# Patient Record
Sex: FEMALE | Race: WHITE | NOT HISPANIC OR LATINO | Employment: FULL TIME | URBAN - METROPOLITAN AREA
[De-identification: names, ages, dates, MRNs, and addresses within clinical notes are randomized per-mention and may not be internally consistent; named-entity substitution may affect disease eponyms.]

---

## 2018-12-26 ENCOUNTER — HOSPITAL ENCOUNTER (EMERGENCY)
Facility: MEDICAL CENTER | Age: 28
End: 2018-12-26
Attending: EMERGENCY MEDICINE
Payer: COMMERCIAL

## 2018-12-26 VITALS
RESPIRATION RATE: 18 BRPM | SYSTOLIC BLOOD PRESSURE: 135 MMHG | TEMPERATURE: 97.2 F | DIASTOLIC BLOOD PRESSURE: 89 MMHG | HEART RATE: 80 BPM | WEIGHT: 203.93 LBS | OXYGEN SATURATION: 96 % | BODY MASS INDEX: 36.13 KG/M2 | HEIGHT: 63 IN

## 2018-12-26 DIAGNOSIS — G44.209 TENSION HEADACHE: ICD-10-CM

## 2018-12-26 DIAGNOSIS — M79.18 MUSCULOSKELETAL PAIN: ICD-10-CM

## 2018-12-26 LAB — HCG SERPL QL: NEGATIVE

## 2018-12-26 PROCEDURE — 700102 HCHG RX REV CODE 250 W/ 637 OVERRIDE(OP): Performed by: EMERGENCY MEDICINE

## 2018-12-26 PROCEDURE — 96374 THER/PROPH/DIAG INJ IV PUSH: CPT

## 2018-12-26 PROCEDURE — 99284 EMERGENCY DEPT VISIT MOD MDM: CPT

## 2018-12-26 PROCEDURE — A9270 NON-COVERED ITEM OR SERVICE: HCPCS | Performed by: EMERGENCY MEDICINE

## 2018-12-26 PROCEDURE — 84703 CHORIONIC GONADOTROPIN ASSAY: CPT

## 2018-12-26 PROCEDURE — 700111 HCHG RX REV CODE 636 W/ 250 OVERRIDE (IP): Performed by: EMERGENCY MEDICINE

## 2018-12-26 PROCEDURE — 96375 TX/PRO/DX INJ NEW DRUG ADDON: CPT

## 2018-12-26 RX ORDER — KETOROLAC TROMETHAMINE 30 MG/ML
30 INJECTION, SOLUTION INTRAMUSCULAR; INTRAVENOUS ONCE
Status: COMPLETED | OUTPATIENT
Start: 2018-12-26 | End: 2018-12-26

## 2018-12-26 RX ORDER — DIAZEPAM 5 MG/1
5 TABLET ORAL NIGHTLY PRN
Qty: 2 TAB | Refills: 0 | Status: SHIPPED | OUTPATIENT
Start: 2018-12-26 | End: 2018-12-28

## 2018-12-26 RX ORDER — DIAZEPAM 5 MG/1
5 TABLET ORAL ONCE
Status: COMPLETED | OUTPATIENT
Start: 2018-12-26 | End: 2018-12-26

## 2018-12-26 RX ORDER — IBUPROFEN 600 MG/1
600 TABLET ORAL EVERY 8 HOURS PRN
Qty: 15 TAB | Refills: 0 | Status: SHIPPED | OUTPATIENT
Start: 2018-12-26 | End: 2018-12-27

## 2018-12-26 RX ORDER — DIPHENHYDRAMINE HYDROCHLORIDE 50 MG/ML
25 INJECTION INTRAMUSCULAR; INTRAVENOUS ONCE
Status: COMPLETED | OUTPATIENT
Start: 2018-12-26 | End: 2018-12-26

## 2018-12-26 RX ORDER — METOCLOPRAMIDE HYDROCHLORIDE 5 MG/ML
10 INJECTION INTRAMUSCULAR; INTRAVENOUS ONCE
Status: COMPLETED | OUTPATIENT
Start: 2018-12-26 | End: 2018-12-26

## 2018-12-26 RX ADMIN — DIPHENHYDRAMINE HYDROCHLORIDE 25 MG: 50 INJECTION, SOLUTION INTRAMUSCULAR; INTRAVENOUS at 10:33

## 2018-12-26 RX ADMIN — DIAZEPAM 5 MG: 5 TABLET ORAL at 10:33

## 2018-12-26 RX ADMIN — METOCLOPRAMIDE 10 MG: 5 INJECTION, SOLUTION INTRAMUSCULAR; INTRAVENOUS at 10:33

## 2018-12-26 RX ADMIN — KETOROLAC TROMETHAMINE 30 MG: 30 INJECTION, SOLUTION INTRAMUSCULAR at 10:33

## 2018-12-26 NOTE — ED PROVIDER NOTES
"ED Provider Note  CHIEF COMPLAINT  Headache     HPI  Charo Leonardo is a 28 y.o. female who presents to the emergency department with a headache. The patient states that she slipped in the shower about 4 weeks ago and hit the back of her head. She did not pass out. She was able to get up and walk around afterward. She denies any associated numbness, weakness, or tingling. She does admit to intermittent blurriness but denies any loss of vision. She has continued to have pain since that time. She states that the pain has gotten much worse over the past few. The pain is located on the back of her head and the op of her neck. Rubbing and cold packs over the area improves the pain. Bright lights make the pain worse which usually happens when she has a headache. She has tried tylenol and aleve. Her last dose of tylenol was at 3 AM. She has a history of migraines since she was 14 years old. She admits to nausea but has not vomited. She denies any seizures, syncope, chest pain, shortness of breath, abdominal pain, urinary symptoms, or diarrhea.     REVIEW OF SYSTEMS  See HPI for further details. All other systems are negative.     PAST MEDICAL HISTORY   has a past medical history of Migraines.    SOCIAL HISTORY  Social History     Social History Main Topics   • Smoking status: Never Smoker   • Smokeless tobacco: Not on file   • Alcohol use No   • Drug use: Yes      Comment: marijuana   • Sexual activity: Not on file       SURGICAL HISTORY  patient denies any surgical history    CURRENT MEDICATIONS  Home Medications    **Home medications have not yet been reviewed for this encounter**         ALLERGIES  Allergies not on file    PHYSICAL EXAM  VITAL SIGNS: /89   Pulse 84   Temp 36.4 °C (97.5 °F) (Temporal)   Resp 18   Ht 1.6 m (5' 3\")   Wt 92.5 kg (203 lb 14.8 oz)   LMP 12/12/2018 (Approximate)   SpO2 96%   Breastfeeding? No   BMI 36.12 kg/m²    Constitutional: Alert and in no apparent distress.  HENT: " Normocephalic atraumatic. Bilateral external ears normal. Nose normal. Mucous membranes are moist.  Eyes: Pupils are equal and reactive. Conjunctiva normal. Non-icteric sclera.   Neck: Normal range of motion without difficulty.  There is no midline cervical spine tenderness.  There is tenderness to palpation over the left paraspinal muscles of the C-spine at the base of the skull.  There is hypertonicity at this location as well.  Cardiovascular: Regular rate and rhythm. No murmurs, gallops or rubs.  Thorax & Lungs: Breath sounds are clear to auscultation bilaterally. No wheezing, rhonchi or rales.  Abdomen: Soft, nontender and nondistended. No peritoneal signs noted.  Skin: Warm and dry. No rashes are noted.  Back: No bony tenderness, No CVA tenderness.   Extremities: 2+ peripheral pulses. Cap refill is less than 2 seconds. No edema, cyanosis, or clubbing.  Musculoskeletal: Good range of motion in all major joints. No tenderness to palpation or major deformities noted.   Neurologic: Alert and oriented ×3.  Cranial nerves II through XII are grossly intact.  5 out of 5 muscle strength bilateral upper and lower extremities.  Sensation is grossly intact.  No pronator drift.  Normal finger to nose.  Normal gait.  Psychiatric: Affect is normal. Judgment appears to be intact.    COURSE & MEDICAL DECISION MAKING  Pertinent Labs & Imaging studies reviewed. (See chart for details)    This is a 28-year-old female presenting to the ED for the evaluation of a headache. On initial evaluation, the patient appeared well and in no acute distress. Her vital signs were stable.  She is grossly neurologically intact and her exam was reassuring.  She did have some tenderness to palpation in the paraspinal muscles of the upper left cervical spine.  She had hypertonicity in this area as well.  Given that her injury was 4 weeks out, that she has no evidence of traumatic injury on exam, and that she is neurologically intact, I do not think  that she needs a CAT scan of her head.  Additionally, she had no midline cervical spine tenderness and had obvious focal tenderness in the paraspinal muscles of the left upper cervical spine.  I have low clinical suspicion for serious cervical spine injury at this time.  The patient was treated here in the emergency department with a migraine cocktail as well as Valium for muscle spasm.  Her pain completely resolved and she tolerated an oral challenge without difficulty.  I do believe she stable for discharge at this time.  She will follow-up with her primary care physician return to the ED with any worsening signs or symptoms.    The patient presents with headache without signs of CNS bleed, stroke, infection, or other serious etiology. The patient is neurologically intact. Given the extremely low risk of these diagnoses further testing and evaluation for these possibilities does not appear to be indicated at this time. The patient has been instructed to return if the symptoms worsen or change in any way.    FINAL IMPRESSION  1. Tension headache    2. Musculoskeletal pain        PRESCRIPTIONS  New Prescriptions    DIAZEPAM (VALIUM) 5 MG TAB    Take 1 Tab by mouth at bedtime as needed (pain) for up to 2 days.    IBUPROFEN (MOTRIN) 600 MG TAB    Take 1 Tab by mouth every 8 hours as needed for Moderate Pain for up to 5 days.       FOLLOW UP  [address] [address]  [phone]  Call in 1 day  To schedule follow-up appointment    [facility], Emergency Dept  [address]  [facility] [address]  [phone]  Go to   As needed, If symptoms worsen        -DISCHARGE-           Electronically signed by: Sera Juan, 12/26/2018 9:52 AM

## 2018-12-26 NOTE — ED NOTES
Pt given written and oral discharge instructions. Pt verbalized understanding of all instructions given. All questions answered. VSS. IV removed. Pt given paper prescriptions as ordered and educated on use and side effects. Pt signed controlled substance informed consent form. Pt reminded not to drive as she has received valium here in the ER today. Given f/u instructions and educated on s/s of when to return to the ER. Pt ambulating independently upon time of discharge in stable condition.

## 2018-12-27 ENCOUNTER — HOSPITAL ENCOUNTER (EMERGENCY)
Facility: MEDICAL CENTER | Age: 28
End: 2018-12-28
Attending: EMERGENCY MEDICINE
Payer: COMMERCIAL

## 2018-12-27 ENCOUNTER — APPOINTMENT (OUTPATIENT)
Dept: RADIOLOGY | Facility: MEDICAL CENTER | Age: 28
End: 2018-12-27
Attending: EMERGENCY MEDICINE
Payer: COMMERCIAL

## 2018-12-27 DIAGNOSIS — G43.809 OTHER MIGRAINE WITHOUT STATUS MIGRAINOSUS, NOT INTRACTABLE: ICD-10-CM

## 2018-12-27 PROCEDURE — 700111 HCHG RX REV CODE 636 W/ 250 OVERRIDE (IP): Performed by: EMERGENCY MEDICINE

## 2018-12-27 PROCEDURE — 96374 THER/PROPH/DIAG INJ IV PUSH: CPT

## 2018-12-27 PROCEDURE — 70450 CT HEAD/BRAIN W/O DYE: CPT

## 2018-12-27 PROCEDURE — 700101 HCHG RX REV CODE 250

## 2018-12-27 PROCEDURE — 20552 NJX 1/MLT TRIGGER POINT 1/2: CPT

## 2018-12-27 PROCEDURE — 96375 TX/PRO/DX INJ NEW DRUG ADDON: CPT

## 2018-12-27 PROCEDURE — 99284 EMERGENCY DEPT VISIT MOD MDM: CPT

## 2018-12-27 PROCEDURE — 72125 CT NECK SPINE W/O DYE: CPT

## 2018-12-27 PROCEDURE — 36415 COLL VENOUS BLD VENIPUNCTURE: CPT

## 2018-12-27 RX ORDER — KETOROLAC TROMETHAMINE 30 MG/ML
15 INJECTION, SOLUTION INTRAMUSCULAR; INTRAVENOUS ONCE
Status: COMPLETED | OUTPATIENT
Start: 2018-12-27 | End: 2018-12-27

## 2018-12-27 RX ORDER — DIPHENHYDRAMINE HYDROCHLORIDE 50 MG/ML
25 INJECTION INTRAMUSCULAR; INTRAVENOUS ONCE
Status: COMPLETED | OUTPATIENT
Start: 2018-12-27 | End: 2018-12-27

## 2018-12-27 RX ORDER — METOCLOPRAMIDE HYDROCHLORIDE 5 MG/ML
10 INJECTION INTRAMUSCULAR; INTRAVENOUS ONCE
Status: COMPLETED | OUTPATIENT
Start: 2018-12-27 | End: 2018-12-27

## 2018-12-27 RX ORDER — DIPHENHYDRAMINE HYDROCHLORIDE 50 MG/ML
INJECTION INTRAMUSCULAR; INTRAVENOUS
Status: COMPLETED
Start: 2018-12-27 | End: 2018-12-27

## 2018-12-27 RX ORDER — KETOROLAC TROMETHAMINE 30 MG/ML
INJECTION, SOLUTION INTRAMUSCULAR; INTRAVENOUS
Status: COMPLETED
Start: 2018-12-27 | End: 2018-12-27

## 2018-12-27 RX ORDER — BUPIVACAINE HYDROCHLORIDE 5 MG/ML
INJECTION, SOLUTION EPIDURAL; INTRACAUDAL
Status: COMPLETED
Start: 2018-12-27 | End: 2018-12-27

## 2018-12-27 RX ADMIN — KETOROLAC TROMETHAMINE 15 MG: 30 INJECTION, SOLUTION INTRAMUSCULAR at 23:00

## 2018-12-27 RX ADMIN — BUPIVACAINE HYDROCHLORIDE: 5 INJECTION, SOLUTION EPIDURAL; INTRACAUDAL; PERINEURAL at 22:30

## 2018-12-27 RX ADMIN — METOCLOPRAMIDE 10 MG: 5 INJECTION, SOLUTION INTRAMUSCULAR; INTRAVENOUS at 23:00

## 2018-12-27 RX ADMIN — DIPHENHYDRAMINE HYDROCHLORIDE 25 MG: 50 INJECTION, SOLUTION INTRAMUSCULAR; INTRAVENOUS at 23:00

## 2018-12-27 ASSESSMENT — ENCOUNTER SYMPTOMS
ABDOMINAL PAIN: 0
NAUSEA: 1
NECK PAIN: 1
DIARRHEA: 0
FEVER: 0
VOMITING: 1
TREMORS: 0
CONSTIPATION: 0
HEADACHES: 1
CHILLS: 0

## 2018-12-27 ASSESSMENT — PAIN SCALES - GENERAL
PAINLEVEL_OUTOF10: 3
PAINLEVEL_OUTOF10: 10
PAINLEVEL_OUTOF10: 10

## 2018-12-28 VITALS
DIASTOLIC BLOOD PRESSURE: 76 MMHG | HEIGHT: 63 IN | OXYGEN SATURATION: 99 % | RESPIRATION RATE: 20 BRPM | TEMPERATURE: 98.4 F | BODY MASS INDEX: 36.95 KG/M2 | SYSTOLIC BLOOD PRESSURE: 127 MMHG | WEIGHT: 208.56 LBS | HEART RATE: 86 BPM

## 2018-12-28 ASSESSMENT — PAIN SCALES - GENERAL: PAINLEVEL_OUTOF10: 0

## 2018-12-28 NOTE — ED NOTES
"D/c inst reviewed w/ the pt. The pt verb understanding and denies questions.  The pt s.o. Picked the pt up. The pt stated that her ha is \"gone\".   "

## 2018-12-28 NOTE — ED TRIAGE NOTES
Chief Complaint   Patient presents with   • Neck Pain     Pt states she was here yesterday am and was evaluated for same complaint of neck pain after slipping in the shower 4 weeks ago     Pt also reports being dizzy and that pain has worsened since yesterday.

## 2018-12-28 NOTE — ED PROVIDER NOTES
ED Provider Note    CHIEF COMPLAINT  Chief Complaint   Patient presents with   • Neck Pain     Pt states she was here yesterday am and was evaluated for same complaint of neck pain after slipping in the shower 4 weeks ago       HPI  Charo Leonardo is a 28 y.o. female who presents to the emergency department with ongoing neck pain and headache.  Patient with a long-standing history of migraines.  She reports that she has had migraines since she was around 14 years old.  Patient reports that she fell around 4 weeks ago and struck the back of her neck and head she reports ongoing pain since that time.  She was seen yesterday here for identical complaint and sent home with a muscle relaxer and NSAIDs.  She reports her headaches have continued.  She is requesting CT scans for further evaluation into her pain.  She reports her headaches do have some associated nausea and vomiting.  Emesis is nonbloody nonbilious.  She reports her headaches have been present for the last 4 weeks.  She denies any associated weakness or numbness.  She denies any bowel or bladder incontinence.  She denies any fevers or constitutional symptoms.  Patient denies any associated chest pain.  Pain in her neck is left superior lateral neck.    REVIEW OF SYSTEMS  Review of Systems   Constitutional: Negative for chills, fever and malaise/fatigue.   Cardiovascular: Negative for chest pain.   Gastrointestinal: Positive for nausea and vomiting. Negative for abdominal pain, constipation and diarrhea.   Genitourinary: Negative for dysuria, frequency and urgency.   Musculoskeletal: Positive for neck pain.   Neurological: Positive for headaches. Negative for tremors.     See HPI for further details. All other systems are negative.     PAST MEDICAL HISTORY   has a past medical history of Migraines.    SOCIAL HISTORY  Social History     Social History Main Topics   • Smoking status: Never Smoker   • Smokeless tobacco: Former User   • Alcohol use No   •  Drug use: Yes      Comment: marijuana   • Sexual activity: Not on file       SURGICAL HISTORY  patient denies any surgical history    CURRENT MEDICATIONS  Home Medications    **Home medications have not yet been reviewed for this encounter**         ALLERGIES  No Known Allergies    PHYSICAL EXAM  Physical Exam   Constitutional: She is oriented to person, place, and time. She appears well-developed and well-nourished.   HENT:   Head: Normocephalic and atraumatic.   Eyes: Conjunctivae are normal.   Neck: Normal range of motion. Neck supple.   Cardiovascular: Normal rate and regular rhythm.    Pulmonary/Chest: Effort normal and breath sounds normal.   Abdominal: Soft. Bowel sounds are normal. She exhibits no distension. There is no tenderness. There is no rebound.   Musculoskeletal:   Mild pain to palpation around C2 through C4 paraspinal musculature.  There is no midline tenderness.   Neurological: She is alert and oriented to person, place, and time.   Distal and proximal strength 5/5 in upper and lower extremities. Normal gait. No dysmetria. No sensation deficits. No visual field deficits. Cranial nerves intact.      Skin: Skin is warm and dry. No rash noted.   Psychiatric: She has a normal mood and affect. Her behavior is normal.     Procedure Note  Patient verbally consented for trigger point injection; area was cleaned with chlorhexidine prior to injection.  Sterile technique was employed.  Paraspinal injections of 1 cc half bupivacaine, half lidocaine with epinephrine were injected at point of maximal tenderness.  I stayed clear of midline.  No injections were made at midline or directed towards midline.  3 injections were made.  Patient tolerated very well.      COURSE & MEDICAL DECISION MAKING  Pertinent Labs & Imaging studies reviewed. (See chart for details)  Very well-appearing patient here with migraines likely exacerbated by cervical spasm versus slipped disc.  Patient without any associated neurologic  sequelae to suggest dissection, the pain is not located over the carotid artery.  Patient without any associated dizziness or bulbar symptoms to suggest vertebral artery dissection.  There is no associated constitutional symptoms or fever to suggest infection.  Patient is very concerned that she has never had any imaging of her neck or head and is requesting this.  We have discussed the risks and benefits of CT, despite the risk of radiation patient would like to continue with CT of both her head and neck.  Given that pain is posttraumatic and constant I do believe evaluation for possible subdural versus occult fracture is warranted.  Patient given trigger point injections block.  Following trigger point injection block patient reports her neck pain is improved however she remains with a mild headache.  She would like a migraine cocktail.  Will give migraine cocktail and fluids as part of the migraine cocktail.  Following the migraine cocktail patient reports her headache has resolved.  Patient will follow up with a primary care physician for ongoing evaluation.  I have asked her to stop taking ibuprofen and instead take Excedrin Migraine for her symptoms.    The patient will not drink alcohol nor drive with prescribed medications. The patient will return for worsening symptoms and is stable at the time of discharge. The patient verbalizes understanding and will comply.    FINAL IMPRESSION  1.   1. Other migraine without status migrainosus, not intractable    Neck pain           Electronically signed by: Joaquín Sharma, 12/27/2018 10:10 PM